# Patient Record
Sex: MALE | Race: WHITE | NOT HISPANIC OR LATINO | URBAN - NONMETROPOLITAN AREA
[De-identification: names, ages, dates, MRNs, and addresses within clinical notes are randomized per-mention and may not be internally consistent; named-entity substitution may affect disease eponyms.]

---

## 2020-06-05 ENCOUNTER — IMPORTED ENCOUNTER (OUTPATIENT)
Dept: URBAN - NONMETROPOLITAN AREA CLINIC 1 | Facility: CLINIC | Age: 55
End: 2020-06-05

## 2020-06-05 PROBLEM — H52.4: Noted: 2020-06-05

## 2020-06-05 PROBLEM — H52.03: Noted: 2020-06-05

## 2020-06-05 PROBLEM — H52.223: Noted: 2020-06-05

## 2020-06-05 PROBLEM — H25.12: Noted: 2020-06-05

## 2020-06-05 PROBLEM — E11.9: Noted: 2020-06-05

## 2020-06-05 PROBLEM — H17.89: Noted: 2020-06-05

## 2020-06-05 PROCEDURE — 92014 COMPRE OPH EXAM EST PT 1/>: CPT

## 2020-06-05 PROCEDURE — 92015 DETERMINE REFRACTIVE STATE: CPT

## 2020-06-05 NOTE — PATIENT DISCUSSION
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 Zoë Marr-  monitor yearly or prn Family History ARMD -  discussed findings w/patient-  no macular changes noted on clinical exam today-  monitor yearly or prn Family History Glaucoma-  discussed findings w/patient-  C/D asymmetry 0.4/0.4 and 0.6/0.6-  IOP stable at 16 16-  will keep monitoring yearly or prn; [facility] Notes: MR 6/5/2020DFE 6/5/2020

## 2021-11-02 ENCOUNTER — IMPORTED ENCOUNTER (OUTPATIENT)
Dept: URBAN - NONMETROPOLITAN AREA CLINIC 1 | Facility: CLINIC | Age: 56
End: 2021-11-02

## 2021-11-02 PROCEDURE — 92014 COMPRE OPH EXAM EST PT 1/>: CPT

## 2021-11-02 PROCEDURE — 92015 DETERMINE REFRACTIVE STATE: CPT

## 2021-11-02 NOTE — PATIENT DISCUSSION
Mixed Astigmatism OU w/Presbyopia-  discussed findings w/patient -  MR no done today discussed w/patient that he can come back at any time for an MR free of charge.  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 Marquis Gonzalez-  monitor yearly or prn Family History ARMD -  discussed findings w/patient-  no macular changes noted on clinical exam today-  monitor yearly or prn Family History Glaucoma-  discussed findings w/patient-  C/D asymmetry 0.4/0.4 and 0.6/0.6-  IOP stable at 16 16-  will keep monitoring yearly or prn; 's Notes: Patient is free to come back at any time for No Charge MR per NL 11/2/2021MR 11/2/2021DFE 11/2/2021

## 2022-04-09 ASSESSMENT — VISUAL ACUITY
OS_SC: 20/25-2
OD_CC: 20/25-1
OS_CC: 20/30+
OU_CC: 20/25-1
OU_CC: 20/20
OS_CC: 20/30+2
OD_CC: 20/30-

## 2022-04-09 ASSESSMENT — TONOMETRY
OS_IOP_MMHG: 17
OD_IOP_MMHG: 16
OD_IOP_MMHG: 16
OS_IOP_MMHG: 16

## 2023-06-06 ENCOUNTER — ESTABLISHED PATIENT (OUTPATIENT)
Dept: RURAL CLINIC 1 | Facility: CLINIC | Age: 58
End: 2023-06-06

## 2023-06-06 DIAGNOSIS — E11.9: ICD-10-CM

## 2023-06-06 DIAGNOSIS — H25.12: ICD-10-CM

## 2023-06-06 DIAGNOSIS — H52.03: ICD-10-CM

## 2023-06-06 DIAGNOSIS — H52.223: ICD-10-CM

## 2023-06-06 DIAGNOSIS — H52.4: ICD-10-CM

## 2023-06-06 PROCEDURE — 92014 COMPRE OPH EXAM EST PT 1/>: CPT

## 2023-06-06 PROCEDURE — 92015 DETERMINE REFRACTIVE STATE: CPT

## 2023-06-06 ASSESSMENT — VISUAL ACUITY
OD_SC: 20/40+1
OD_SC: 20/30
OU_SC: 20/30
OU_SC: 20/40+1
OS_SC: 20/30
OS_SC: 20/40+1

## 2023-06-06 ASSESSMENT — TONOMETRY
OD_IOP_MMHG: 14
OS_IOP_MMHG: 14

## 2024-11-04 ENCOUNTER — COMPREHENSIVE EXAM (OUTPATIENT)
Dept: RURAL CLINIC 1 | Facility: CLINIC | Age: 59
End: 2024-11-04

## 2024-11-04 DIAGNOSIS — H25.12: ICD-10-CM

## 2024-11-04 DIAGNOSIS — H52.4: ICD-10-CM

## 2024-11-04 DIAGNOSIS — E11.9: ICD-10-CM

## 2024-11-04 DIAGNOSIS — H52.223: ICD-10-CM

## 2024-11-04 DIAGNOSIS — H52.03: ICD-10-CM

## 2024-11-04 PROCEDURE — 92014 COMPRE OPH EXAM EST PT 1/>: CPT

## 2024-11-04 PROCEDURE — 92015 DETERMINE REFRACTIVE STATE: CPT
